# Patient Record
Sex: MALE | Race: WHITE
[De-identification: names, ages, dates, MRNs, and addresses within clinical notes are randomized per-mention and may not be internally consistent; named-entity substitution may affect disease eponyms.]

---

## 2017-11-07 ENCOUNTER — HOSPITAL ENCOUNTER (OUTPATIENT)
Dept: HOSPITAL 92 - ERS | Age: 62
Setting detail: OBSERVATION
LOS: 1 days | Discharge: HOME | End: 2017-11-08
Attending: INTERNAL MEDICINE | Admitting: INTERNAL MEDICINE
Payer: COMMERCIAL

## 2017-11-07 VITALS — BODY MASS INDEX: 33.9 KG/M2

## 2017-11-07 DIAGNOSIS — I10: ICD-10-CM

## 2017-11-07 DIAGNOSIS — E80.7: ICD-10-CM

## 2017-11-07 DIAGNOSIS — R07.89: Primary | ICD-10-CM

## 2017-11-07 DIAGNOSIS — M19.90: ICD-10-CM

## 2017-11-07 DIAGNOSIS — J44.1: ICD-10-CM

## 2017-11-07 DIAGNOSIS — Z79.899: ICD-10-CM

## 2017-11-07 DIAGNOSIS — R06.02: ICD-10-CM

## 2017-11-07 DIAGNOSIS — F17.210: ICD-10-CM

## 2017-11-07 DIAGNOSIS — E78.5: ICD-10-CM

## 2017-11-07 DIAGNOSIS — E66.9: ICD-10-CM

## 2017-11-07 DIAGNOSIS — F10.10: ICD-10-CM

## 2017-11-07 DIAGNOSIS — Z98.890: ICD-10-CM

## 2017-11-07 LAB
ALP SERPL-CCNC: 68 U/L (ref 40–150)
ALT SERPL W P-5'-P-CCNC: 12 U/L (ref 8–55)
ANION GAP SERPL CALC-SCNC: 14 MMOL/L (ref 10–20)
APTT PPP: 25.8 SEC (ref 22.9–36.1)
AST SERPL-CCNC: 15 U/L (ref 5–34)
BASOPHILS # BLD AUTO: 0.1 THOU/UL (ref 0–0.2)
BASOPHILS NFR BLD AUTO: 1.7 % (ref 0–1)
BILIRUB SERPL-MCNC: 1.3 MG/DL (ref 0.2–1.2)
BUN SERPL-MCNC: 8 MG/DL (ref 8.4–25.7)
CALCIUM SERPL-MCNC: 9.5 MG/DL (ref 7.8–10.44)
CHLORIDE SERPL-SCNC: 98 MMOL/L (ref 98–107)
CO2 SERPL-SCNC: 28 MMOL/L (ref 22–29)
CREAT CL PREDICTED SERPL C-G-VRATE: 0 ML/MIN (ref 70–130)
EOSINOPHIL # BLD AUTO: 0.1 THOU/UL (ref 0–0.7)
EOSINOPHIL NFR BLD AUTO: 0.7 % (ref 0–10)
GLOBULIN SER CALC-MCNC: 3 G/DL (ref 2.4–3.5)
HCT VFR BLD CALC: 52.3 % (ref 42–52)
LYMPHOCYTES # BLD: 3.8 THOU/UL (ref 1.2–3.4)
LYMPHOCYTES NFR BLD AUTO: 43 % (ref 21–51)
MONOCYTES # BLD AUTO: 0.6 THOU/UL (ref 0.11–0.59)
MONOCYTES NFR BLD AUTO: 6.7 % (ref 0–10)
NEUTROPHILS # BLD AUTO: 4.3 THOU/UL (ref 1.4–6.5)
PROTHROMBIN TIME: 12.7 SEC (ref 12–14.7)
RBC # BLD AUTO: 5.33 MILL/UL (ref 4.7–6.1)
TROPONIN I SERPL DL<=0.01 NG/ML-MCNC: (no result) NG/ML (ref ?–0.03)
WBC # BLD AUTO: 8.9 THOU/UL (ref 4.8–10.8)

## 2017-11-07 PROCEDURE — 93005 ELECTROCARDIOGRAM TRACING: CPT

## 2017-11-07 PROCEDURE — 80061 LIPID PANEL: CPT

## 2017-11-07 PROCEDURE — G0378 HOSPITAL OBSERVATION PER HR: HCPCS

## 2017-11-07 PROCEDURE — 85730 THROMBOPLASTIN TIME PARTIAL: CPT

## 2017-11-07 PROCEDURE — 80053 COMPREHEN METABOLIC PANEL: CPT

## 2017-11-07 PROCEDURE — 94760 N-INVAS EAR/PLS OXIMETRY 1: CPT

## 2017-11-07 PROCEDURE — 78452 HT MUSCLE IMAGE SPECT MULT: CPT

## 2017-11-07 PROCEDURE — 85610 PROTHROMBIN TIME: CPT

## 2017-11-07 PROCEDURE — 83036 HEMOGLOBIN GLYCOSYLATED A1C: CPT

## 2017-11-07 PROCEDURE — 93017 CV STRESS TEST TRACING ONLY: CPT

## 2017-11-07 PROCEDURE — 71010: CPT

## 2017-11-07 PROCEDURE — 84443 ASSAY THYROID STIM HORMONE: CPT

## 2017-11-07 PROCEDURE — 85025 COMPLETE CBC W/AUTO DIFF WBC: CPT

## 2017-11-07 PROCEDURE — 96372 THER/PROPH/DIAG INJ SC/IM: CPT

## 2017-11-07 PROCEDURE — 96375 TX/PRO/DX INJ NEW DRUG ADDON: CPT

## 2017-11-07 PROCEDURE — 96365 THER/PROPH/DIAG IV INF INIT: CPT

## 2017-11-07 PROCEDURE — 36415 COLL VENOUS BLD VENIPUNCTURE: CPT

## 2017-11-07 PROCEDURE — A9500 TC99M SESTAMIBI: HCPCS

## 2017-11-07 PROCEDURE — 83880 ASSAY OF NATRIURETIC PEPTIDE: CPT

## 2017-11-07 PROCEDURE — 82553 CREATINE MB FRACTION: CPT

## 2017-11-07 PROCEDURE — 94640 AIRWAY INHALATION TREATMENT: CPT

## 2017-11-07 PROCEDURE — 93010 ELECTROCARDIOGRAM REPORT: CPT

## 2017-11-07 PROCEDURE — 84484 ASSAY OF TROPONIN QUANT: CPT

## 2017-11-07 PROCEDURE — 76705 ECHO EXAM OF ABDOMEN: CPT

## 2017-11-07 NOTE — HP-2
DATE OF ADMISSION:  11/07/2017

 

LOCATION OF ADMISSION:  [facility].

 

CODE STATUS:  FULL.

 

PRIMARY CARE PHYSICIAN:  Dr. Snow.

 

ATTENDING:  Maury Adair MD

 

RESIDENT:  Pernell Dooley MD, PGY-1.

 

CHIEF COMPLAINT:  Shortness of breath, chest pain.

 

HISTORY OF PRESENT ILLNESS:  This is a 62-year-old  male with chest 
pain, presents with chest pain and shortness of breath.  He states that a 
couple of weeks ago he started feeling ill, started having an increased cough.  
He said when he was coughing he was not coughing an abnormal sputum.  Last 
night he had an episode where he could not hardly breathe.  It lasted about 2 
hours.  He was continually using his nebulizer and albuterol, said there were 
multiple times in this episode where he could not get his breath, said he 
finally got better.  During this time, he also reported being clammy.  Denies 
any fever or chills.  States he did not come to the ER as he had a doctor's 
appointment with Dr. Snow today.  He also reports having chest pain a couple 
of nights ago lasting about 30-40 seconds.  He said he has had this chest pain 
for about 2-3 months, says it happens about 3-4 times a week.  He says the most 
times it happens is after his cutting grass on his riding lawnmower or arguing 
with his wife.  He said that he also has some shortness of breath and he said 
he tried one time using albuterol and did not relieve the pain.  He said his 
head squeezes with the chest pain and reports a little bit of numbness and 
tingling unilaterally in his left leg when the chest pain comes on.  He reports 
nothing seems to relieve it, goes away pretty shortly.  Denies any other 
symptoms.  Denies any nausea, vomiting, diarrhea, or constipation.  Denies any 
headaches, loss of vision, or dizziness.  Denies any weakness or decreased 
strength.  Denies any other complaints at this time.

 

REVIEW OF SYSTEMS:  All review of systems not listed in the HPI are otherwise 
negative at this time. 

 

PAST MEDICAL HISTORY:

1.  COPD.

2.  Hypertension.

3.  Tobacco use.

4.  Hyperlipidemia.

5.  Osteoarthritis.

 

PAST SURGICAL HISTORY:  He has had a shoulder surgery in the past.

 

ALLERGIES:  None.

 

MEDICATIONS:  Hydrochlorothiazide 25 mg, ipratropium/albuterol 0.5 mg/3 mg, 
ProAir 2-3 times a day.

 

FAMILY HISTORY:  Mother had stomach cancer.  Dad, insignificant.

 

SOCIAL HISTORY:  He is a 1 pack per day smoker for the last 40 years.  Drinks 4-
5 beers every other day.  No illicit drug use.

 

PHYSICAL EXAMINATION:

VITAL SIGNS:  Blood pressure 124/81, pulse is 60, respirations 22, temperature 
98.7, pulse ox is 99% on room air, current weight is 111 kilograms. 

GENERAL:  He is alert and oriented x3.  Well developed, obese, appropriately 
interactive.

EYES:  Conjunctivae within normal limits.

ENT:  Oropharynx within normal limit.  Moist mucous membranes.

NECK:  Supple, no lymphadenopathy, no thyromegaly, no bruits.

CARDIOVASCULAR:  Regular rate and rhythm.  No murmurs, no gallops.  Radial 
pulses, pedal pulses palpated bilaterally.

RESPIRATORY:  Abnormal breathing effort.  No retractions.  He does have 
expiratory wheezes in all lung quadrants on auscultation.

SKIN:  Warm, dry.

ABDOMEN:  Soft, nontender to palpation.  Bowel sounds heard in all 4 quadrants.

MUSCULOSKELETAL:  Structure within normal limits.  Moves all extremities 
bilaterally.

NEUROLOGIC:  No focal neuro deficit.

PSYCHIATRIC:  Appropriate.

 

LABORATORY DATA:  White blood cell count 8.9, hemoglobin 17.4, hematocrit 52.3, 
platelets 195.  Sodium 136, potassium 3.8, chloride is 98, bicarbonate 20, BUN 
is 8, creatinine normal, and glucose 99, albumin is 4.0, total protein is 7.0, 
total bilirubin is 1.3, calcium is 9.5, AST 15, ALT 12, alkaline phosphatase is 
68, CK-MB is 1.4, troponin is less than 0.010 and BNP is 35.  EKG showed maybe 
some conduction delay.  Chest x-ray, no acute process noted.

 

ASSESSMENT AND PLAN:

1.  Atypical chest pain.  We will admit him to tele observation for continuous 
tele monitoring.  We will make him n.p.o. at midnight for an a.m. stress test.  
We will start him on heart healthy diet for now.  We will trend cardiac profile 
q.3 hours.  We will check a fasting lipid panel, TSH, magnesium, phos and A1c.  
We will give him nitro p.r.n. for chest pain to see if that helps with the 
pain.  He was not taking aspirin or statin.  We will start these at this time 
for cardioprotective effects.

2.  Mild chronic obstructive pulmonary disease exacerbation.  We will start him 
on Levaquin and prednisone to help with breathing and coughing. We will start 
him on DuoNebs q.4 hour schedule and give DuoNebs q.2 hours p.r.n. as needed 
for shortness of breath.  I also have ordered to monitor O2 sat and to use 
oxygen to keep above 92.

3.  Hypertension.  We will continue home medications.

4.  Hyperlipidemia.  We will get a fasting lipid panel and start statin.

5.  Elevated bilirubin.  We will get a right upper quadrant ultrasound, likely 
due to beer use.  We will also check his PT and PTT to check liver function.

6.  Tobacco abuse.  We have counseled him on quitting.

7.  Deep venous thrombosis prophylaxis.  We will use Lovenox at this time.

 

MARIANOD

## 2017-11-07 NOTE — RAD
ONE VIEW CHEST:

 

HISTORY:

Chest pain.

 

COMPARISON:

05/20/2015 and 06/23/2015

 

FINDINGS:

Portable upright chest demonstrates a normal cardiac silhouette.  The pulmonary vessels and hilum ar
e normal.  The costophrenic angles are clear.  No mass.  No consolidation.  No pneumothorax or osseo
us abnormality.

 

IMPRESSION:

No acute cardiopulmonary process.

 

POS: [facility]

## 2017-11-07 NOTE — HP
OBSERVATION STATUS

 

CHIEF COMPLAINT:  Chest pain.

 

HISTORY OF PRESENT ILLNESS:  This is a 62-year-old old male with past history of hypertension, hyper
lipidemia, tobacco abuse, and COPD, who presents with a week history of worsening cough with questio
nable sputum production and increased shortness of breath.  Last night he had an episode about 2 rosita
rs where he just could not catch his breath and nearly came into the hospital, but he felt like he m
ay be improved a bit with use of his inhaler.  This worsened today and he decided to come and be see
n.  In addition to this, he has also had some lower sternal chest pain that does not radiate, it is 
moderate to severe in nature and lasts anywhere from 30 seconds to a minute at a time.  Sometimes it
 is exertional, sometimes it is not, it is not consistently relieved with rest, it is not associated
 with any nausea, vomiting, or diaphoresis.  He has not been taking any medications for this.

 

REVIEW OF SYSTEMS:  Constitutional:  Denies fever or chills.  Eyes:  Denies icterus or injection or 
eye pain. Ears, Nose, Mouth, Throat.  Denies sore throat or ear pain. Cardiovascular:  See HPI. Resp
iratory:  See HPI.  Gastrointestinal:  No nausea, vomiting, constipation, or diarrhea.  Genitourinar
y:  No dysuria or urgency.  Musculoskeletal:  No myalgias or arthralgias.  Skin:  He has multiple ki
nd of scattered abrasions, but denies a wound or rash.  Neurologic:  Denies weakness or numbness.  P
sychiatric:  Denies anxiety or depression.  All other systems reviewed and are negative.

 

PAST MEDICAL HISTORY:  Positive for hypertension, COPD, and hyperlipidemia.

 

PAST SURGICAL HISTORY:  Positive for 2 shoulder surgeries.

 

FAMILY HISTORY:  Noncontributory.

 

MEDICATIONS:  Hydrochlorothiazide and ProAir.

 

SOCIAL HISTORY:  Drinks 4-5 beers a day to every other day and has a longstanding history, also has 
about a 40-pack-year history of smoking.  Denies drug use.

 

ALLERGIES:  No known drug allergies.

 

PHYSICAL EXAMINATION:

VITAL SIGNS:  Pulse in the 70s, blood pressure is one-teens over 70s, satting 96% on room air and af
ebrile.

GENERAL:  He is in no acute distress.

EYES:  Without icterus or injection.  Pupils equal, round, reactive to light. 

EARS, NOSE, MOUTH, THROAT:  Moist mucous membranes.  Pinna is normal and no rhinorrhea.

NECK:  Trachea midline, mobile without thyromegaly.

CARDOVASCULAR:  Regular rate and rhythm without murmur, gallops or rub.  No peripheral edema or sinclair
tid bruit.

RESPIRATORY:  He is slightly tachypneic.  No prominent retractions.  He has diffuse inspiratory and 
expiratory wheezes with inspiratory crackles on the left side. 

GASTROINTESTINAL:  Bowel sounds positive.  Obese.  Nontender to palpation.  I cannot palpate any org
anomegaly.

GENITOURINARY:  Deferred. 

MUSCULOSKELETAL:  Without any deformity or contracture or joint swelling.

SKIN:  He has some scattered abrasions on the lower extremities, but otherwise no wound or rash.

NEUROLOGIC:  Motor 5/5 throughout.  Sensation intact to light touch throughout as well.

PSYCHIATRIC:  Alert and oriented x3.  Mood and affect appropriate for current medical condition.

 

LABORATORY DATA:  CBC was reviewed, pertinent for MCV of 98.1.  Chemistries reviewed and it is perti
nent for a total bilirubin of 1.3, normal renal function, negative BNP and a negative troponin.  I a
m unable to view of the chest x-ray myself, but the interpretation reads no acute cardiopulmonary pr
ocess and again I will attempt to find the EKG which is currently not visible to me.

 

ASSESSMENT AND PLAN:  This is a 62-year-old male with:

1.  Chest pain.  We will go ahead and make n.p.o. at midnight and schedule for pharmacologic stress 
test in the morning after ruling out by enzymes.

2.  Chronic obstructive pulmonary disease exacerbation.  He has been started on Levaquin.  This is a
 reasonable choice.  We will also schedule DuoNebs q.4, p.r.n. as well.  Prednisone 40 daily for 5 d
ays and begin a controller medication in the morning.

3.  Obesity.  Counseled diet and exercise.

4.  Tobacco abuse, counseled cessation.

5.  Alcohol abuse, counseled cessation as well.

6.  Elevated bilirubin.  This could possibly be due to his chronic alcohol use.  We will perform a r
ight upper quadrant ultrasound to investigate further coagulation, coags to be sent.

7.  Hyperlipidemia.  We will start a statin and aspirin as well for his chest pain.  Parenteral nitr
oglycerin p.r.n. and morphine p.r.n. for his chest pain.

8.  DVT prophylaxis with Lovenox.

9.  History of hypertension.  We will monitor, currently he is doing well.  Continue hydrochlorothia
zide or ACE inhibitor pending results of laboratory testing to include TSH, lipid panel, A1c and hep
atitis panel.

## 2017-11-08 VITALS — TEMPERATURE: 97.8 F | SYSTOLIC BLOOD PRESSURE: 131 MMHG | DIASTOLIC BLOOD PRESSURE: 63 MMHG

## 2017-11-08 LAB
ALP SERPL-CCNC: 65 U/L (ref 40–150)
ALT SERPL W P-5'-P-CCNC: 11 U/L (ref 8–55)
ANION GAP SERPL CALC-SCNC: 13 MMOL/L (ref 10–20)
AST SERPL-CCNC: 12 U/L (ref 5–34)
BASOPHILS # BLD AUTO: 0.1 THOU/UL (ref 0–0.2)
BASOPHILS NFR BLD AUTO: 0.6 % (ref 0–1)
BILIRUB SERPL-MCNC: 0.7 MG/DL (ref 0.2–1.2)
BUN SERPL-MCNC: 14 MG/DL (ref 8.4–25.7)
CALCIUM SERPL-MCNC: 9.4 MG/DL (ref 7.8–10.44)
CHLORIDE SERPL-SCNC: 99 MMOL/L (ref 98–107)
CHOLEST SERPL-MCNC: 169 MG/DL
CO2 SERPL-SCNC: 26 MMOL/L (ref 22–29)
CREAT CL PREDICTED SERPL C-G-VRATE: 135 ML/MIN (ref 70–130)
EOSINOPHIL # BLD AUTO: 0 THOU/UL (ref 0–0.7)
EOSINOPHIL NFR BLD AUTO: 0 % (ref 0–10)
GLOBULIN SER CALC-MCNC: 2.9 G/DL (ref 2.4–3.5)
HCT VFR BLD CALC: 48.5 % (ref 42–52)
LDLC SERPL CALC-MCNC: 101 MG/DL
LYMPHOCYTES # BLD: 3.1 THOU/UL (ref 1.2–3.4)
LYMPHOCYTES NFR BLD AUTO: 22.4 % (ref 21–51)
MONOCYTES # BLD AUTO: 0.8 THOU/UL (ref 0.11–0.59)
MONOCYTES NFR BLD AUTO: 5.6 % (ref 0–10)
NEUTROPHILS # BLD AUTO: 9.9 THOU/UL (ref 1.4–6.5)
RBC # BLD AUTO: 4.92 MILL/UL (ref 4.7–6.1)
WBC # BLD AUTO: 13.8 THOU/UL (ref 4.8–10.8)

## 2017-11-08 NOTE — NM
NUCLEAR MEDICINE CARDIAC PERFUSION EXAMINATION WITH EJECTION FRACTION:

 

HISTORY:  

62-year-old male with chest pain, COPD, hypertension, and dyslipidemia. 

 

TECHNIQUE:  

A two day nuclear medicine cardiac perfusion examination was performed. Rest images were obtained us
ing 30.2 mCi of technetium-99m sestamibi. Stress images were obtained the following day using 27 mCi
 of technetium-99m sestamibi and Lexiscan. 

 

FINDINGS:

Tomographic images show no fixed or reversible perfusion defects. Gated images show normal wall miya
on with an ejection fraction of 62%. 

 

EDV:  176 ml

LHR:  0.4

TID:  1.1

 

IMPRESSION: 

No evidence of ischemia.  

 

POS: [facility]

## 2017-11-08 NOTE — PDOC.FM
- Subjective


Subjective: 





Pt reports doing well this morning. Is just hungry waiting for his stress test. 

Had no chest pain overnight. Denies any SOB or coughing episodes overnight. 

Denies any fever or chills. Denies any other problems at this time. 





- Objective


MAR Reviewed: Yes


Vital Signs & Weight: 


 Vital Signs (12 hours)











  Temp Pulse Resp BP BP Pulse Ox


 


 11/08/17 06:52   76  15    96


 


 11/08/17 05:01  97.6 F  75  20  124/66   94 L


 


 11/08/17 02:14   78  12   


 


 11/07/17 23:15  98.3 F  77  18   106/62  96


 


 11/07/17 22:06    12   








 Weight











Weight                         112.128 kg














I&O: 


 











 11/07/17 11/08/17 11/09/17





 06:59 06:59 06:59


 


Intake Total  480 


 


Balance  480 











Result Diagrams: 


 11/08/17 04:58





 11/08/17 04:58


Radiology Reviewed by me: Yes (No new imaging to be reviewed today)





<Pernell Dooley - Last Filed: 11/08/17 08:36>





- Objective


Vital Signs & Weight: 


 Vital Signs (12 hours)











  Temp Pulse Resp BP BP Pulse Ox


 


 11/08/17 10:32   60  14    97


 


 11/08/17 08:00  98.6 F  77  22 H   110/59 L  92 L


 


 11/08/17 06:52   76  15    96


 


 11/08/17 05:01  97.6 F  75  20  124/66   94 L


 


 11/08/17 02:14   78  12   








 Weight











Weight                         247 lb 3.2 oz














I&O: 


 











 11/07/17 11/08/17 11/09/17





 06:59 06:59 06:59


 


Intake Total  480 


 


Balance  480 











Result Diagrams: 


 11/08/17 04:58





 11/08/17 04:58





<Sabas Peterson - Last Filed: 11/08/17 12:57>





Phys Exam





- Physical Examination


HEENT: moist MMs


Neck: no nodes, no JVD, supple, full ROM


Respiratory: wheezing present, clear to auscultation bilateral


Cardiovascular: RRR, no significant murmur, no rub


Gastrointestinal: soft, non-tender, no distention, positive bowel sounds


Musculoskeletal: no edema, pulses present


Neurological: non-focal, moves all 4 limbs


Psychiatric: normal affect, A&O x 3


Skin: no rash





<Pernell Dooley - Last Filed: 11/08/17 08:36>





Dx/Plan


(1) Chest pain


Code(s): R07.9 - CHEST PAIN, UNSPECIFIED   Status: Acute   


Plan: 


-NPO, awaiting stress test this AM, if negative likely patient can be D/c 

today. 


-Trops x3 were negative


-No acute events on tele monitoring 


-continue with aspirin


-Nitro PRN for chest pain











(2) Acute exacerbation of chronic obstructive pulmonary disease (COPD)


Code(s): J44.1 - CHRONIC OBSTRUCTIVE PULMONARY DISEASE W (ACUTE) EXACERBATION   

Status: Acute   


Plan: 


-PO Levaquin and Prednisone


-Farhad Duonebs Q4hrs, Q2hrs for breakthrough SOB 


-Not requiring Oxygen at this time 


-Will continue to monitor vitals and tx accordingly








(3) Elevated bilirubin


Code(s): R17 - UNSPECIFIED JAUNDICE   Status: Acute   


Plan: 


Total bili .7 today. Resolved


-Awaiting results of RUQ u/s


-Likely 2/2 to alcohol use. 








(4) Essential hypertension


Code(s): I10 - ESSENTIAL (PRIMARY) HYPERTENSION   Status: Acute   


Plan: 


-continued home meds


-BP stable, will continue to monitor








(5) Hyperlipidemia


Code(s): E78.5 - HYPERLIPIDEMIA, UNSPECIFIED   Status: Acute   


Plan: 


-started on Lipitor


-Will continue tx when D/C








(6) Tobacco abuse


Code(s): Z72.0 - TOBACCO USE   Status: Acute   


Plan: 


-counseled on quitting


-Using a nicotine patch








<Pernell Dooley - Last Filed: 11/08/17 08:36>





Attending Addendum





- Attending Addendum


I personally evaluated the patient and discussed the management with Dr. Dooley


I agree with the History, Examination, Assessment and Plan documented above.








<Sabas Peterson - Last Filed: 11/08/17 12:57>

## 2017-11-08 NOTE — ULT
RIGHT UPPER QUADRANT ABDOMINAL ULTRASOUND:

 

Date:  11/08/17 

 

HISTORY:  

Elevated bilirubin. Evaluate for cirrhosis. 

 

TECHNIQUE:  

Multiplanar Gray scale and color Doppler images were obtained in a right upper quadrant abdominal ul
trasound. 

 

FINDINGS:

There is a small, 8.0 mm, cyst adjacent to the gallbladder. No biliary dilatation is seen. The gallb
ladder is predominantly contracted without shadowing stones or gallbladder wall thickening. The comm
on bile duct is normal measuring 4.0 mm. 

 

The pancreas was obscured by bowel gas. There are echogenic nonshadowing foci of the right kidney wh
ich could represent nonobstructing renal calcifications. There is no evidence of hydronephrosis. The
 right kidney measures 10.3 cm in length. 

 

IMPRESSION: 

1.  Hepatic cysts. 

2.  Proximal nonobstructing right renal calculi. 

 

 

POS: [facility]

## 2017-11-09 NOTE — DIS-2
DATE OF ADMISSION:  11/07/2017

 

DATE OF DISCHARGE:  11/08/2017

 

ADMITTING ATTENDING:  Dr. Adair.

 

DISCHARGE ATTENDING:  Sabas Peterson M.D.

 

RESIDENT:  Pernell Dooley M.D., PGY1.

 

CONSULTATIONS:  None.

 

PROCEDURES:  Nuclear medicine stress test, which showed no evidence of ischemia.

 

DIAGNOSES:

1.  Atypical chest pain.

2.  Mild chronic obstructive pulmonary disease exacerbation.

3.  Obesity.

4.  Tobacco abuse.

5.  Alcohol abuse.

6.  Elevated bilirubin.

7.  Hyperlipidemia.

8.  History of hypertension.

 

DISCHARGE MEDICATIONS:  Hydrochlorothiazide 25 mg p.o. daily, prednisone 40 mg 
p.o. for 3 more days, nicotine 14 mg transdermal q.24 hours, Levaquin 750 mg 
p.o. once a day for 5 more days, DuoNeb 3 mL nebulizers,  Lipitor 40 mg p.o. 
daily, aspirin 81 mg daily, ProAir 2 puffs inhalers q.4 hours  p.r.n., and 
regular strength Tylenol.

 

DISCONTINUED MEDICATIONS:  There were no discontinued medications at this visit.

 

HISTORY OF PRESENT ILLNESS AND BRIEF HOSPITAL COURSE:  This is a 62-year-old 
male with a history of hypertension, hyperlipidemia, tobacco abuse, and COPD, 
who came in with a 2-week history of worsening cough, having a cough attack the 
night before that lasted 2 hours where he could not catch his breath after 
using multiple nebulizers and using albuterol inhaler.  He went to the [facility] and was sent over here.  There, he also reported 
that he has had a 2-3 month history of chest pain that feels like someone is 
punching him in the chest, does not radiate anywhere, this happens 4-5 times a 
week, lasting anywhere from 30 seconds to a minute, sometimes exertional, 
sometimes just when he is sitting down, nothing seems to alleviate, and it is 
not associated with any other symptoms.  One time, he did try his albuterol 
inhaler and did not help it improve and at that time he was admitted.  
Troponins were trended, they were less than 0.01 x3.  He had a nuclear stress 
test, which was negative.  His chest x-ray on admission showed no acute 
cardiopulmonary process.  He is admitted for telemetry observation and no acute 
events happened overnight.  We checked a CBC, which stayed normal.  His white 
blood cell count went from 8.9 TO 13.8 but likely due to steroids.  His CMP on 
admission, he did have elevated total bilirubin of 1.3, when checked the next 
day was 0.7.  The patient did report a history of drinking 4-5 beers a day, 
likely since he had not been drinking for the last few days, his bilirubin 
trended downwards.  We got a fasting lipid panel on him, which showed a 
cholesterol of 169 and LDL cholesterol of 101 and HDL cholesterol of 50.  His 
heart disease risk was 3%.  Last checked TSH on him, which was normal at 0.9.  
At this time, patient on his medication list, he only had nebulizers, his ProAir
, and hydrochlorothiazide.  He was not on an aspirin or statin.  At this time, 
we started him on a baby aspirin and started him on a moderate dose statin for 
his risk of heart disease.  With a mild COPD exacerbation when we saw him, he 
is satting 99% on room air.  Did have expiratory wheezes, we started him on 
p.o. Levaquin and p.o. course of high dose prednisone.  At this time, after 
stress test was negative, the patient was doing better, had not reported any 
chest pain, and was stable for discharge.  We sent him home to finish off a 
course of Levaquin for 5 more days and finish his prednisone for 3 more days 
for a total of 5 days of prednisone.

 

OUTLOOK:  Stable.

 

DISCHARGE INSTRUCTIONS:

1.  Discharge location:  Home.

2.  Diet:  Heart healthy.

3.  Activity:  Activity as tolerated.

4.  Followup:  He will follow up with his primary care provider in 2 weeks for 
hospital followup.

 

VIRIDIANA

## 2017-11-15 NOTE — EKG
Test Reason : 

Blood Pressure : ***/*** mmHG

Vent. Rate : 074 BPM     Atrial Rate : 074 BPM

   P-R Int : 200 ms          QRS Dur : 120 ms

    QT Int : 404 ms       P-R-T Axes : 060 003 023 degrees

   QTc Int : 448 ms

 

Normal sinus rhythm

Non-specific intra-ventricular conduction delay

Borderline ECG

When compared with ECG of 15-MAY-2012 10:42,

QT has lengthened

Confirmed by CAMPBELL DASILVA (2) on 11/15/2017 9:48:39 PM

 

Referred By:  ABBY           Confirmed By:CAMPBELL DASILVA

## 2018-01-08 ENCOUNTER — HOSPITAL ENCOUNTER (OUTPATIENT)
Dept: HOSPITAL 92 - RAD | Age: 63
Discharge: HOME | End: 2018-01-08
Attending: INTERNAL MEDICINE
Payer: COMMERCIAL

## 2018-01-08 DIAGNOSIS — R06.00: Primary | ICD-10-CM

## 2018-01-08 PROCEDURE — 71046 X-RAY EXAM CHEST 2 VIEWS: CPT

## 2018-01-08 NOTE — RAD
AP VIEW OF THE CHEST:

 

INDICATION: 

History of dyspnea.

 

COMPARISON: 

Prior exam dated 6/23/15.

 

FINDINGS: 

No focal consolidation or pleural effusion is evident.  Cardiomediastinal silhouette is within normal
 limits.  Multilevel spondylosis is similar.

 

IMPRESSION: 

No acute cardiopulmonary abnormality.

 

POS: [facility]

## 2018-05-10 ENCOUNTER — HOSPITAL ENCOUNTER (OUTPATIENT)
Dept: HOSPITAL 92 - CT | Age: 63
Discharge: HOME | End: 2018-05-10
Attending: FAMILY MEDICINE
Payer: COMMERCIAL

## 2018-05-10 DIAGNOSIS — I70.0: ICD-10-CM

## 2018-05-10 DIAGNOSIS — F17.201: ICD-10-CM

## 2018-05-10 DIAGNOSIS — J43.9: ICD-10-CM

## 2018-05-10 DIAGNOSIS — F17.210: Primary | ICD-10-CM

## 2018-05-10 DIAGNOSIS — R91.1: ICD-10-CM

## 2018-05-10 PROCEDURE — G0297 LDCT FOR LUNG CA SCREEN: HCPCS

## 2018-05-10 NOTE — CT
CT PULMONARY LUNG SCAN WITHOUT CONTRAST:

 

INDICATIONS:

A 63-year-old male with a smoking history of 40+ years with shortness of breath.

 

COMPARISON:

No comparisons are available.

 

TECHNIQUE:

A low-dosed CT examination was performed of the thorax without IV contrast, utilizing a lung cancer s
creening protocol.  Axial, sagittal, and coronal reformatted images were constructed from the raw lisa
a.

 

FINDINGS:

There is a small calcified granuloma within the left lower lobe on image 39 of series 3.  There is a 
small, sub-4-mm pulmonary nodule in the right upper lobe, anterior segment, on image 30 of series 3. 
 No suspicious pulmonary nodule is evident.  There is scattered central lobular emphysema.  There are
 scattered calcifications involving the thoracic aorta and coronary arteries.  The adrenal glands are
 normal appearing.  No acute osseous abnormality is identified.  There is multilevel spondylosis of t
he lumbar spine.  There is confluent flow in the anterior osteophytes, spanning T7 through T11, which
 can be seen with DISH.

 

IMPRESSION:

1.  Lung-RADS category 2S.  Tiny nodule seen within the right upper lobe with a low likelihood of bec
oming clinically significant active cancer due to size.  Continued annual screening with low dose CT 
in 12 months is recommended.

2.  Category S:  Centrilobular emphysema. Coronary artery thoracic aortic calcifications.  Findings o
f prior granulomatous disease. Diffuse idiopathic skeletal hyperostosis (DISH) like changes of the mi
d to lower thoracic spine.

 

POS: [facility]

## 2019-02-07 ENCOUNTER — HOSPITAL ENCOUNTER (OUTPATIENT)
Dept: HOSPITAL 92 - RAD | Age: 64
Discharge: HOME | End: 2019-02-07
Attending: INTERNAL MEDICINE
Payer: COMMERCIAL

## 2019-02-07 DIAGNOSIS — R06.00: Primary | ICD-10-CM

## 2019-02-07 DIAGNOSIS — I70.0: ICD-10-CM

## 2019-02-07 PROCEDURE — 71046 X-RAY EXAM CHEST 2 VIEWS: CPT

## 2019-02-07 NOTE — RAD
EXAM:

CHEST TWO VIEWS:

 

History: 

Dyspnea. 

 

Comparison: 

10-18-18

 

FINDINGS: 

Heart size is within normal limits. The lungs are clear. No pneumonia, edema, pleural effusion or oth
er acute process. 

 

IMPRESSION: 

Minimal stable chronic changes. No acute intrathoracic disease. Atherosclerosis of the aorta. 

 

POS: [facility]

## 2019-04-07 ENCOUNTER — HOSPITAL ENCOUNTER (EMERGENCY)
Dept: HOSPITAL 92 - ERS | Age: 64
Discharge: HOME | End: 2019-04-07
Payer: COMMERCIAL

## 2019-04-07 DIAGNOSIS — J44.1: Primary | ICD-10-CM

## 2019-04-07 DIAGNOSIS — E78.00: ICD-10-CM

## 2019-04-07 DIAGNOSIS — F17.210: ICD-10-CM

## 2019-04-07 DIAGNOSIS — I10: ICD-10-CM

## 2019-04-07 LAB
ALBUMIN SERPL BCG-MCNC: 4.3 G/DL (ref 3.4–4.8)
ALP SERPL-CCNC: 85 U/L (ref 40–150)
ALT SERPL W P-5'-P-CCNC: 17 U/L (ref 8–55)
ANION GAP SERPL CALC-SCNC: 14 MMOL/L (ref 10–20)
AST SERPL-CCNC: 20 U/L (ref 5–34)
BASOPHILS # BLD AUTO: 0.1 THOU/UL (ref 0–0.2)
BASOPHILS NFR BLD AUTO: 1.2 % (ref 0–1)
BILIRUB SERPL-MCNC: 1 MG/DL (ref 0.2–1.2)
BUN SERPL-MCNC: 10 MG/DL (ref 8.4–25.7)
CALCIUM SERPL-MCNC: 9.4 MG/DL (ref 7.8–10.44)
CHLORIDE SERPL-SCNC: 102 MMOL/L (ref 98–107)
CO2 SERPL-SCNC: 27 MMOL/L (ref 23–31)
CREAT CL PREDICTED SERPL C-G-VRATE: 0 ML/MIN (ref 70–130)
EOSINOPHIL # BLD AUTO: 0.1 THOU/UL (ref 0–0.7)
EOSINOPHIL NFR BLD AUTO: 0.5 % (ref 0–10)
GLOBULIN SER CALC-MCNC: 3.1 G/DL (ref 2.4–3.5)
GLUCOSE SERPL-MCNC: 85 MG/DL (ref 80–115)
HGB BLD-MCNC: 17.1 G/DL (ref 14–18)
LYMPHOCYTES # BLD: 3.2 THOU/UL (ref 1.2–3.4)
LYMPHOCYTES NFR BLD AUTO: 31.5 % (ref 21–51)
MCH RBC QN AUTO: 32.1 PG (ref 27–31)
MCV RBC AUTO: 98.1 FL (ref 78–98)
MONOCYTES # BLD AUTO: 0.7 THOU/UL (ref 0.11–0.59)
MONOCYTES NFR BLD AUTO: 7 % (ref 0–10)
NEUTROPHILS # BLD AUTO: 6.1 THOU/UL (ref 1.4–6.5)
NEUTROPHILS NFR BLD AUTO: 59.8 % (ref 42–75)
PLATELET # BLD AUTO: 146 THOU/UL (ref 130–400)
POTASSIUM SERPL-SCNC: 4.1 MMOL/L (ref 3.5–5.1)
RBC # BLD AUTO: 5.33 MILL/UL (ref 4.7–6.1)
SODIUM SERPL-SCNC: 139 MMOL/L (ref 136–145)
WBC # BLD AUTO: 10.2 THOU/UL (ref 4.8–10.8)

## 2019-04-07 PROCEDURE — 83880 ASSAY OF NATRIURETIC PEPTIDE: CPT

## 2019-04-07 PROCEDURE — 71045 X-RAY EXAM CHEST 1 VIEW: CPT

## 2019-04-07 PROCEDURE — 85025 COMPLETE CBC W/AUTO DIFF WBC: CPT

## 2019-04-07 PROCEDURE — 94640 AIRWAY INHALATION TREATMENT: CPT

## 2019-04-07 PROCEDURE — 93005 ELECTROCARDIOGRAM TRACING: CPT

## 2019-04-07 PROCEDURE — 36415 COLL VENOUS BLD VENIPUNCTURE: CPT

## 2019-04-07 PROCEDURE — 80053 COMPREHEN METABOLIC PANEL: CPT

## 2019-04-07 NOTE — RAD
FRadiograph chest one view:



HISTORY:

64-year-old male with dyspnea



FINDINGS:

The visualized lung fields are clear. The cardiomediastinal silhouette is normal. No pneumothorax.



IMPRESSION:

No acute cardiopulmonary findings.



Reported By: Micah Turcios 

Electronically Signed:  4/7/2019 6:19 PM

## 2019-06-20 ENCOUNTER — HOSPITAL ENCOUNTER (OUTPATIENT)
Dept: HOSPITAL 92 - CT | Age: 64
Discharge: HOME | End: 2019-06-20
Attending: FAMILY MEDICINE
Payer: COMMERCIAL

## 2019-06-20 DIAGNOSIS — J44.9: ICD-10-CM

## 2019-06-20 DIAGNOSIS — R91.8: ICD-10-CM

## 2019-06-20 DIAGNOSIS — F17.200: Primary | ICD-10-CM

## 2019-06-20 PROCEDURE — G0297 LDCT FOR LUNG CA SCREEN: HCPCS

## 2019-06-20 NOTE — CT
CT CHEST WITHOUT CONTRAST:

 

INDICATIONS:

COPD.  Prior history of nicotine dependency with smoking.

 

COMPARISON:

CT lung scan from 05/10/2018.

 

TECHNIQUE:

Multiple axial tomograms obtained through the chest without IV enhancement.  A low dose screening pro
tocol was followed.

 

FINDINGS:

The tiny nodule in the right upper lobe, described on the prior exam, is not apparent today.

 

On today's exam, there is a focal nodular density in the posterior right lower lobe, paravertebral lo
cation, abutting the pleural surface, which is new from the prior study.  This also has a linear comp
onent and may represent focal infiltrate or atelectasis.  In the axial plane, the nodular portion jossue
sures approximately 10 mm.

 

In the left lung, there is a 3 mm calcified granuloma in the left lower lobe, peripherally.

 

The mediastinum is unremarkable.  Images through the upper abdomen are unremarkable.  Degenerative sp
ine changes are stable in appearance with bridging osteophytes seen anterolaterally, in the mid thora
cic spine, as described previously.

 

IMPRESSION:

A new area of nodularity in the posterior right lower lobe, measuring up to 10 mm in the axial plane.
  This could represent a focal inflammatory infiltrate or focal atelectasis.  Recommend clinical geraldine
elation regarding symptoms of infiltrate.  Short-term followup is suggested, with repeat noncontrast 
CT in three to four months to re-evaluate.

 

POS: [facility]

## 2022-07-20 ENCOUNTER — HOSPITAL ENCOUNTER (OUTPATIENT)
Dept: HOSPITAL 92 - BICCT | Age: 67
Discharge: HOME | End: 2022-07-20
Attending: STUDENT IN AN ORGANIZED HEALTH CARE EDUCATION/TRAINING PROGRAM
Payer: MEDICARE

## 2022-07-20 DIAGNOSIS — F17.219: ICD-10-CM

## 2022-07-20 DIAGNOSIS — Z12.2: Primary | ICD-10-CM

## 2022-07-20 DIAGNOSIS — J42: ICD-10-CM

## 2022-07-20 PROCEDURE — 71271 CT THORAX LUNG CANCER SCR C-: CPT

## 2023-05-12 ENCOUNTER — HOSPITAL ENCOUNTER (OUTPATIENT)
Dept: HOSPITAL 92 - CSHULT | Age: 68
Discharge: HOME | End: 2023-05-12
Attending: STUDENT IN AN ORGANIZED HEALTH CARE EDUCATION/TRAINING PROGRAM
Payer: MEDICARE

## 2023-05-12 DIAGNOSIS — Z13.6: Primary | ICD-10-CM

## 2023-05-12 PROCEDURE — 76706 US ABDL AORTA SCREEN AAA: CPT

## 2023-08-09 ENCOUNTER — HOSPITAL ENCOUNTER (OUTPATIENT)
Dept: HOSPITAL 92 - CT | Age: 68
Discharge: HOME | End: 2023-08-09
Payer: MEDICARE

## 2023-08-09 DIAGNOSIS — F17.210: ICD-10-CM

## 2023-08-09 DIAGNOSIS — J44.9: ICD-10-CM

## 2023-08-09 DIAGNOSIS — Z12.2: Primary | ICD-10-CM

## 2023-08-09 PROCEDURE — 71271 CT THORAX LUNG CANCER SCR C-: CPT

## 2025-06-26 ENCOUNTER — HOSPITAL ENCOUNTER (OUTPATIENT)
Dept: HOSPITAL 92 - CSHSLEEP | Age: 70
Discharge: HOME | End: 2025-06-26
Payer: MEDICARE

## 2025-06-26 DIAGNOSIS — E66.9: ICD-10-CM

## 2025-06-26 DIAGNOSIS — R06.83: ICD-10-CM

## 2025-06-26 DIAGNOSIS — I10: ICD-10-CM

## 2025-06-26 DIAGNOSIS — G47.33: Primary | ICD-10-CM

## 2025-06-26 DIAGNOSIS — R09.02: ICD-10-CM

## 2025-06-26 DIAGNOSIS — G47.10: ICD-10-CM

## 2025-06-26 DIAGNOSIS — R51.9: ICD-10-CM

## 2025-06-26 PROCEDURE — 95800 SLP STDY UNATTENDED: CPT
